# Patient Record
Sex: MALE | Race: WHITE | NOT HISPANIC OR LATINO | URBAN - NONMETROPOLITAN AREA
[De-identification: names, ages, dates, MRNs, and addresses within clinical notes are randomized per-mention and may not be internally consistent; named-entity substitution may affect disease eponyms.]

---

## 2022-05-25 NOTE — PROGRESS NOTES
"Subjective   Bernardo Clay is a 41 y.o. male who presents today for initial evaluation     Chief Complaint:  Psychological evaluation    History of Present Illness:   Today is an initial evaluation. He states he is here today due to his and his wife's  request. He works at [employer], he has received several threats from the inmates and others outside of the custodial in recent past.. He states reported it his supervisor per protocol.. He told his spouse () about the threats, he told her to be more careful with their son, watch for suspicious people, she blames him, saying that the son would be in danger being around him, and she trying to have him to receive supervised visitation only.  He states officers gets threats all of the time. His spouse wants him to quit his job. 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 He states that was a alf where numerous high profile inmates were housed.  He has been  x 2 years, his son is 3 years old. This is his first marriage and only has 1 child. Denies any type of abuse in the home. He states it is his fault for the marriage breaking down, because he worked all of the time, he didn't make time for his marriage or family. He states he regrets that, he states he is being like his father, working and not being home. Denies any infidelity.   Born in [place], [place], raised in [place], with both parents, 1 sister, 2 brothers. He describes his childhood as \"good\". His dad worked a lot, and was not home \"a lot\".  He is close to both of his parents. He denies any type of previous abuse including sexual, emotional or physical abuse. He states his mother was a nurse, his father worked 6 days a week. He states he had ADHD as a child. He denies any current or prior depression, anxiety or mood disorders. He denies any prior psychiatric hospitalizations. He states the elections were last week, there was some stress over " "who was going to win for the  position. His current  didn't win the election. He states he encouraged the current  to go out and campaign but he did not.  He states the \"new \" has states he would replace the majority of the staff at the [employer], but he states he can always get another job. He states he sleeps well, denies NM. Appetite is good.  He denies any significant anxiety or depression symptoms. Also denies any mood lability. Denies alcohol, drug use or tobacco use. Denies SI/HI/AVH.  Denies thoughts of self-harm. Denies any health issues.  He doesn't have a current PCP.          The following portions of the patient's history were reviewed and updated as appropriate: allergies, current medications, past family history, past medical history, past social history, past surgical history and problem list.      Past Medical History:  Past Medical History:   Diagnosis Date   • Thyroid cancer (HCC)        Social History:  Social History     Socioeconomic History   • Marital status: Unknown   Tobacco Use   • Smoking status: Never Smoker   • Smokeless tobacco: Never Used   Vaping Use   • Vaping Use: Never used   Substance and Sexual Activity   • Alcohol use: Never   • Drug use: Never   • Sexual activity: Defer       Family History:  History reviewed. No pertinent family history.    Past Surgical History:  Past Surgical History:   Procedure Laterality Date   • THYROID SURGERY         Problem List:  There is no problem list on file for this patient.      Allergy:   No Known Allergies     Current Medications:   No current outpatient medications on file.     No current facility-administered medications for this visit.       Review of Symptoms:    Review of Systems   Psychiatric/Behavioral: Negative for agitation, behavioral problems, dysphoric mood, hallucinations, self-injury, sleep disturbance, suicidal ideas and depressed mood. The patient is nervous/anxious (Situational).    All other systems " reviewed and are negative.      Objective   Physical Exam:   Blood pressure 146/92, pulse 76, weight 77.6 kg (171 lb).  There is no height or weight on file to calculate BMI.    Appearance:  male appears stated age, no acute distress noted.    Gait, Station, Strength: Steady, posture erect, WNL      Mental Status Exam: 5/26/22  Hygiene:   good  Cooperation:  Cooperative  Eye Contact:  Good  Psychomotor Behavior:  Appropriate  Affect:  Appropriate  Mood: normal  Hopelessness: Denies  Speech:  Normal  Thought Process:  Goal directed and Linear  Thought Content:  Normal  Suicidal:  None  Homicidal:  None  Hallucinations:  None  Delusion:  None  Memory:  Intact  Orientation:  Person, Place, Time and Situation  Reliability:  good  Insight:  Good  Judgement:  Good  Impulse Control:  Good  Physical/Medical Issues:  No      PHQ-Score Total:  PHQ-9 Total Score: 0   ZANA-7 Total Score: 1    Lab Results:   Office Visit on 05/26/2022   Component Date Value Ref Range Status   • External Amphetamine Screen Urine 05/26/2022 Negative   Final   • External Benzodiazepine Screen Uri* 05/26/2022 Negative   Final   • External Cocaine Screen Urine 05/26/2022 Negative   Final   • External THC Screen Urine 05/26/2022 Negative   Final   • External Methadone Screen Urine 05/26/2022 Negative   Final   • External Methamphetamine Screen Ur* 05/26/2022 Negative   Final   • External Oxycodone Screen Urine 05/26/2022 Negative   Final   • External Buprenorphine Screen Urine 05/26/2022 Negative   Final   • External MDMA 05/26/2022 Negative   Final   • External Opiates Screen Urine 05/26/2022 Negative   Final       Assessment & Plan   Problems Addressed this Visit    None     Visit Diagnoses     Situational anxiety    -  Primary    Medication management        Relevant Orders    [facility] Screen (Completed)      Diagnoses       Codes Comments    Situational anxiety    -  Primary ICD-10-CM: F41.8  ICD-9-CM: 300.09     Medication management      ICD-10-CM: Z79.899  ICD-9-CM: V58.69         Social History     Tobacco Use   Smoking Status Never Smoker   Smokeless Tobacco Never Used     JEANNETTE reviewed and appropriate.     -The benefits of a healthy diet and exercise were discussed with patient, especially the positive effects they have on mental health.   -Reviewed previous available documentation  -Reviewed most recent available labs   -Did not note any mental or psychological symptoms.  -I do not have any opinion on his choice of occupation.      Visit Diagnoses:    ICD-10-CM ICD-9-CM   1. Situational anxiety  F41.8 300.09   2. Medication management  Z79.899 V58.69         TREATMENT PLAN/GOALS: No medications or psychotherapy recommended at this time    MEDICATION ISSUES:  No medications prescribed..     MEDS ORDERED DURING VISIT:  No orders of the defined types were placed in this encounter.    Did not find any mental or psychological symptoms. Will be happy to follow up in the future if needed for any anxiety, depression or mood symptoms that may develop in the future.     No follow-ups on file.     Prognosis: Good           This document has been electronically signed by Trupti Iglesias, APRN   May 26, 2022 15:52 EDT    Part of this note may be an electronic transcription/translation of spoken language to printed text using the Dragon Dictation System.

## 2022-05-26 ENCOUNTER — OFFICE VISIT (OUTPATIENT)
Dept: PSYCHIATRY | Facility: CLINIC | Age: 42
End: 2022-05-26

## 2022-05-26 VITALS — SYSTOLIC BLOOD PRESSURE: 146 MMHG | WEIGHT: 171 LBS | HEART RATE: 76 BPM | DIASTOLIC BLOOD PRESSURE: 92 MMHG

## 2022-05-26 DIAGNOSIS — F41.8 SITUATIONAL ANXIETY: Primary | ICD-10-CM

## 2022-05-26 DIAGNOSIS — Z79.899 MEDICATION MANAGEMENT: ICD-10-CM

## 2022-05-26 LAB
EXTERNAL AMPHETAMINE SCREEN URINE: NEGATIVE
EXTERNAL BENZODIAZEPINE SCREEN URINE: NEGATIVE
EXTERNAL BUPRENORPHINE SCREEN URINE: NEGATIVE
EXTERNAL COCAINE SCREEN URINE: NEGATIVE
EXTERNAL MDMA: NEGATIVE
EXTERNAL METHADONE SCREEN URINE: NEGATIVE
EXTERNAL METHAMPHETAMINE SCREEN URINE: NEGATIVE
EXTERNAL OPIATES SCREEN URINE: NEGATIVE
EXTERNAL OXYCODONE SCREEN URINE: NEGATIVE
EXTERNAL THC SCREEN URINE: NEGATIVE

## 2022-05-26 PROCEDURE — 90792 PSYCH DIAG EVAL W/MED SRVCS: CPT | Performed by: NURSE PRACTITIONER

## 2022-05-27 ENCOUNTER — PATIENT ROUNDING (BHMG ONLY) (OUTPATIENT)
Dept: PSYCHIATRY | Facility: CLINIC | Age: 42
End: 2022-05-27

## 2022-05-27 NOTE — PROGRESS NOTES
May 27, 2022    Hello, may I speak with Bernardo Clay?    My name Krista    I am  with [facility]  [facility]  [address] [place] [address]  [place] [address] [phone].    Before we get started may I verify your date of birth? 1980    I am calling to officially welcome you to our practice and ask about your recent visit. Is this a good time to talk? YES     Tell me about your visit with us. What things went well?  IT WAS ALL GOOD       We're always looking for ways to make our patients' experiences even better. Do you have recommendations on ways we may improve?  NO    Overall were you satisfied with your first visit to our practice?        I appreciate you taking the time to speak with me today. Is there anything else I can do for you?  NO      Thank you, and have a great day.

## 2022-06-20 ENCOUNTER — TELEPHONE (OUTPATIENT)
Dept: PSYCHIATRY | Facility: CLINIC | Age: 42
End: 2022-06-20